# Patient Record
(demographics unavailable — no encounter records)

---
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
## 2024-12-02 NOTE — PHYSICAL EXAM
[EOMI] : grossly EOMI [NL] : moves all extremities x4, warm, well perfused x4 [Warm, Well Perfused x4] : warm, well perfused x4 [Capillary Refill <2s] : capillary refill < 2s [Warm] : warm [Clear] : clear [de-identified] : FROM intact at L wrist. NO focal tenderness reported to palpation.  [de-identified] : no edema noted

## 2024-12-02 NOTE — REVIEW OF SYSTEMS
[Myalgia] : no myalgia [Restriction of Motion] : no restriction of motion [Bone Deformity] : no bone deformity [Swelling of Joint] : no swelling of joint [Rash] : no rash [Easy Bruising] : no tendency for easy bruising [Bleeding Gums] : no bleeding gums [FreeTextEntry1] : Pt denies numness or tingling of L wrist, hand or fingers
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
## 2024-12-06 NOTE — PHYSICAL EXAM
[EOMI] : grossly EOMI [Erythematous Oropharynx] : erythematous oropharynx [NL] : normotonic [Warm] : warm [Clear] : clear [Conjuctival Injection] : no conjunctival injection [Eyelid Swelling] : no eyelid swelling [Allergic Shiners] : no allergic shiners [Enlarged Tonsils] : tonsils not enlarged [Vesicles] : no vesicles [Exudate] : no exudate [Ulcerative Lesions] : no ulcerative lesions [Palate petechiae] : palate without petechiae

## 2024-12-06 NOTE — HISTORY OF PRESENT ILLNESS
[de-identified] : Sore throat x 3 days [FreeTextEntry6] : Pt presented to [facility] for irritation and soreness of back of throat x 3 days, usually worse upon waking up and improved by eating and drinking throughout the day.

## 2024-12-06 NOTE — DISCUSSION/SUMMARY
[FreeTextEntry1] : sore throat, stuffy nose and cough - R/O strep pharyngitis. Rapid strep: negative. PCR pending. F/u 2-3 days for results - Postnasal drip vs viral URI vs sleeping in dry heat - Supportive care, antihistamines and avoiding airway irritant exposure recommended - Seek further care for any new or worsening s/sx. - Lozenges dispensed for immediate use - Pt verbalized understanding and endorsed plan - Safe return to class  Depression as screened on exam 3 days ago. Pt denies any current SI or plans. She plans on seeing SWer on next school day. She is also in touch with school support staff for any urgent needs.

## 2024-12-06 NOTE — REVIEW OF SYSTEMS
[Sore Throat] : sore throat [Fever] : no fever [Malaise] : no malaise [Headache] : no headache [Itchy Eyes] : no itchy eyes [Ear Pain] : no ear pain [Nasal Discharge] : no nasal discharge [Nasal Congestion] : no nasal congestion [Sinus Pressure] : no sinus pressure [Chest Pain] : no chest pain [Wheezing] : no wheezing [Cough] : no cough [Congestion] : no congestion [Shortness of Breath] : no shortness of breath [Appetite Changes] : no appetite changes [Vomiting] : no vomiting [Abdominal Pain] : no abdominal pain [Rash] : no rash

## 2024-12-09 NOTE — REVIEW OF SYSTEMS
[Fever] : fever [Headache] : no headache [Ear Pain] : no ear pain [Nasal Congestion] : nasal congestion [Sore Throat] : sore throat [Abdominal Pain] : no abdominal pain [Rash] : no rash

## 2024-12-09 NOTE — DISCUSSION/SUMMARY
[FreeTextEntry1] : Pt returned to [facility] to discuss strep PCR results. She is symptomatic and tested positive for strep pharyngitis group C/G Pharyngitis care and treatment reviewed with pt who verbalized understanding Called mom to discuss management, no answer. Unable to leave voice message- mailbox full Amoxicillin therapy started x 10 days. Meds appropriately labeled. Side effects reviewed Strep transmission discussed. Caution advised with use of toothbrush, utensils and drinks Reassess in 10 days Safe discharge to class

## 2024-12-09 NOTE — HISTORY OF PRESENT ILLNESS
[FreeTextEntry6] : Pt returned to [facility] for recent strep test results for pharyngitis. Positive for Strep C/G. She denies improvement in dysphagia and soreness of throat.

## 2024-12-09 NOTE — PHYSICAL EXAM
[Erythematous Oropharynx] : erythematous oropharynx [Enlarged Tonsils] : tonsils not enlarged [Exudate] : no exudate [Palate petechiae] : palate petechiae [NL] : regular rate and rhythm, normal S1, S2 audible, no murmurs [Warm] : warm [Clear] : clear

## 2025-01-10 NOTE — DISCUSSION/SUMMARY
[FreeTextEntry1] : Pt presented to [facility] for STI screening today.  Tests performed. Results pending LMP: 1/8/2025 LSA: Feb 2024 Pt reports use of male condoms with most sexual encounters to prevent pregnancy and STIs.   Birth control goal: Tianna states that she is currently not sexually active and does not plan on having sex in the immediate future. She is receptive to contraception education Detailed contraceptive counseling provided today. Pt verbalized understanding. No BCM started at this time.  Vaccine records reviewed yesterday. Pt is due for seasonal influenza and COVID vaccines. A vaccine consent form was provided yesterday. Pt brought back the form, but it's signed in the incorrect boxes.  I attempted to call mom while pt was present in office, for clarification. No answer. Vaccine consent form sent home with pt again.   F/U next week for results and vaccines

## 2025-01-10 NOTE — REVIEW OF SYSTEMS
[Negative] : Constitutional [Rash] : no rash [Dysuria] : no dysuria [Polyuria] : no polyuria [Hematuria] : no hematuria [Vaginal Dischage] : no vaginal discharge [Vaginal Itch] : no vaginal itch [Irregular Menstrual Cycle] : no irregular menstrual cycle [Vaginal Pain] : no vaginal pain

## 2025-01-11 NOTE — DISCUSSION/SUMMARY
[FreeTextEntry1] : Patient is a 15 yo F who presents to the [facility] for dysmenorrhea with an associated frontal headache that started yesterday.   Patient reports she has regular monthly cycles that last for 5 days Uses pads, states on her heaviest days she uses 2 pads. Flow is heavy to light, denies clots.  Denies back pain, n/v, diarrhea, lightheadedness or dizziness at this time. Has not taken any medication for symptoms today.   Last SA February 2024 with male, condoms sometimes. Patient reports she has not had STI testing before. Testing offered and accepted, declines completing today but will return tomorrow.   Plan: -Warm pack provided and Ibuprofen 400 mg given PO, tolerated -Educated on supportive care and for new or worsening symptoms to RTC -Appointment made for STI testing tomorrow, declined condoms today. Encouraged consistent condom use for STI/pregnancy prevention and she verbalized understanding.  -VIS/Consent for Influenza and Covid booster

## 2025-01-11 NOTE — PHYSICAL EXAM
[Tenderness] : no tenderness [Laceration] : no laceration [Ecchymosis] : no ecchymosis [Swelling] : no swelling [Traumatic] : atraumatic [Symmetric Chest Wall] : symmetric chest wall [Tenderness With Palpation of Chest Wall] : no tenderness with palpation of chest wall [NL] : regular rate and rhythm, normal S1, S2 audible, no murmurs [Soft] : soft [Distended] : nondistended [Normal Bowel Sounds] : normal bowel sounds [Hepatosplenomegaly] : no hepatosplenomegaly [Tenderness with Palpation] : tenderness with palpation [Splenomegaly] : no splenomegaly [Hepatomegaly] : no hepatomegaly [Mass] : no mass palpable [McBurney's point tenderness] : no McBurney's point tenderness [Rebound tenderness] : no rebound tenderness [Psoas Sign Positive] : psoas sign negative [Obturator Sign Positive] : obturator sign negative [FreeTextEntry9] : Mild bilateral lower TTP

## 2025-01-11 NOTE — REVIEW OF SYSTEMS
[Abdominal Pain] : abdominal pain [Negative] : Genitourinary [Headache] : headache [Eye Discharge] : no eye discharge [Eye Redness] : no eye redness [Itchy Eyes] : no itchy eyes [Changes in Vision] : no changes in vision [Ear Pain] : no ear pain [Nasal Discharge] : no nasal discharge [Nasal Congestion] : no nasal congestion [Snoring] : no snoring [Sinus Pressure] : no sinus pressure [Sore Throat] : no sore throat [Appetite Changes] : no appetite changes [PO Intolerance] : PO tolerance [Vomiting] : no vomiting [Diarrhea] : no diarrhea [Constipation] : no constipation [Gaseous] : not gaseous

## 2025-01-11 NOTE — HISTORY OF PRESENT ILLNESS
[FreeTextEntry6] : Patient is a 15 yo F who presents to the [facility] for dysmenorrhea with an associated frontal headache that started yesterday.    Patient reports she has regular monthly cycles that last for 5 days Uses pads, states on her heaviest days she uses 2 pads. Flow is heavy to light, denies clots.  Denies back pain, n/v, diarrhea, lightheadedness or dizziness at this time. Has not taken any medication for symptoms today.   [place] 13 yo  Last SA February 2024 with male  1 Lifetime male partner Condoms sometimes Denies /GI symptoms

## 2025-01-11 NOTE — HISTORY OF PRESENT ILLNESS
[FreeTextEntry6] : Patient is a 15 yo F who presents to the [facility] for dysmenorrhea with an associated frontal headache that started yesterday.    Patient reports she has regular monthly cycles that last for 5 days Uses pads, states on her heaviest days she uses 2 pads. Flow is heavy to light, denies clots.  Denies back pain, n/v, diarrhea, lightheadedness or dizziness at this time. Has not taken any medication for symptoms today.   [place] 15 yo  Last SA February 2024 with male  1 Lifetime male partner Condoms sometimes Denies /GI symptoms

## 2025-01-11 NOTE — DISCUSSION/SUMMARY
[FreeTextEntry1] : Patient is a 15 yo F who presents to the [facility] for dysmenorrhea with an associated frontal headache that started yesterday.   Patient reports she has regular monthly cycles that last for 5 days Uses pads, states on her heaviest days she uses 2 pads. Flow is heavy to light, denies clots.  Denies back pain, n/v, diarrhea, lightheadedness or dizziness at this time. Has not taken any medication for symptoms today.   Last SA February 2024 with male, condoms sometimes. Patient reports she has not had STI testing before. Testing offered and accepted, declines completing today but will return tomorrow.   Plan: -Warm pack provided and Ibuprofen 400 mg given PO, tolerated -Educated on supportive care and for new or worsening symptoms to RTC -Appointment made for STI testing tomorrow, declined condoms today. Encouraged consistent condom use for STI/pregnancy prevention and she verbalized understanding.  -VIS/Consent for Influenza and Covid booster

## 2025-01-14 NOTE — HISTORY OF PRESENT ILLNESS
[Influenza] : Influenza [COVID-19] : COVID-19 [FreeTextEntry1] : Pt returned to [facility] for recent STI results and vaccine follow up. She is currently due for seasonal influenza, and COVID-19 vaccines. She reports feeling well today. She denies any significant reactions to vaccines administered in the past.

## 2025-01-14 NOTE — DISCUSSION/SUMMARY
[FreeTextEntry1] : Recent STI results reviewed: GC, CT, HIV and syphilis are all negative.  Safe sex practices advised  Vaccine consent received for influenza and COVID vaccines There are no known contraindications to vaccines today Both vaccines administered. Post vaccine care reviewed Pt observed for immediate side effects after vaccines. She remains well Safe discharge to class  F/u 2 days for Hep A and HPV vaccines

## 2025-01-16 NOTE — DISCUSSION/SUMMARY
[FreeTextEntry1] : Pt returned to [facility] for vaccine updates today. She is due for HPV and Hep A. Parent consent obtained previously Both vaccines administered Post vaccine care reviewed Safe discharge to class F/U November for Men ACW[facility] booster

## 2025-01-16 NOTE — HISTORY OF PRESENT ILLNESS
[Hepatitis A] : Hepatitis A [HPV] : HPV [FreeTextEntry1] : Pt returned to [facility] for Hep A and HPV vaccines today. She denies any new concerns or significant side effects to previously administered vaccines

## 2025-02-24 NOTE — HISTORY OF PRESENT ILLNESS
[FreeTextEntry6] : Chest pain while running x 2 hour ago, improved with resting and drinking water. Pt states that this is a common occurrence for 1 year, and she would like a "check up"

## 2025-02-24 NOTE — DISCUSSION/SUMMARY
[FreeTextEntry1] : Chest pain with exertion x 1 year Normal physical exam and vitals reviewed. 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 She is currently NOT CLEARED to play sports. This was communicated with [facility] , Mr. Rai, in writing.  Cardiologist consult required. Referral provided.  Outreach attempted to mom. Mom did not answer. Voicemail left. Stepdad contacted via phone. Chest pain and appropriate assessment discussed. Dad endorsed plan. F/U after cardiologist consult.

## 2025-02-24 NOTE — PHYSICAL EXAM
[NL] : no acute distress, alert [Symmetric Chest Wall] : symmetric chest wall [Clear to Auscultation Bilaterally] : clear to auscultation bilaterally [Regular Rate and Rhythm] : regular rate and rhythm [Normal S1, S2 audible] : normal S1, S2 audible

## 2025-02-24 NOTE — REVIEW OF SYSTEMS
[Chest Pain] : chest pain [Cough] : cough [Shortness of Breath] : shortness of breath [Appetite Changes] : appetite changes [Intolerance to Exercise] : intolerance to exercise [PO Intolerance] : PO tolerance [Vomiting] : no vomiting [Diarrhea] : no diarrhea [Constipation] : no constipation [Gaseous] : not gaseous [Abdominal Pain] : no abdominal pain [Weakness] : no weakness [Rash] : no rash

## 2025-02-25 NOTE — DISCUSSION/SUMMARY
[FreeTextEntry1] : Tianna presented to [facility] for chest pain present intermittently for 24 hours. Normal physical exam and vitals reviewed. Reassurance provided.  Differential discussed. Indigestion vs Costochondritis likely Ibuprofen and antacid dispensed for immediate use. Pt tolerated Management discussed with brenda via phone.  Cardiologist referral was provided yesterday. Brenad states that he plans on scheduling an appt soon. Pt advised to avoid all vigorous physical activity at this time, including track (communicated with school yesterday). PSAL clearance pending cardiologist clearance Brenda was asked to follow up with PCP, Dr. Gary Lujan as well. He endorsed plan Advised to seek urgent medical care for any new or worsening s/sx. Pt verbalized understanding Safe discharge to class

## 2025-02-25 NOTE — REVIEW OF SYSTEMS
[Chest Pain] : chest pain [Negative] : Constitutional [Fever] : no fever [Chills] : no chills [Diaphoresis] : not diaphoretic [Intolerance to Exercise] : tolerance to exercise [Tachypnea] : not tachypneic [Wheezing] : no wheezing [Cough] : no cough [Shortness of Breath] : no shortness of breath [Appetite Changes] : no appetite changes [PO Intolerance] : PO tolerance [Vomiting] : no vomiting [Diarrhea] : no diarrhea [Constipation] : no constipation [Abdominal Pain] : no abdominal pain [Weakness] : no weakness [Lightheadness] : no lightheadness [Dizziness] : no dizziness [Restriction of Motion] : no restriction of motion [Rash] : no rash

## 2025-02-25 NOTE — PHYSICAL EXAM
[EOMI] : grossly EOMI [Supple] : supple [Symmetric Chest Wall] : symmetric chest wall [NL] : regular rate and rhythm, normal S1, S2 audible, no murmurs [Normotonic] : normotonic [Warm] : warm [Clear] : clear [Allergic Shiners] : no allergic shiners [Nasal Flaring] : no nasal flaring [Inflamed Nasal Mucosa] : no nasal mucosa inflammation [Erythematous Oropharynx] : nonerythematous oropharynx [Vesicles] : no vesicles [Exudate] : no exudate [Tenderness With Palpation of Chest Wall] : no tenderness with palpation of chest wall [Capillary Refill <2s] : capillary refill < 2s [de-identified] : No chest pain reported with movement of upper body [de-identified] : skin turgor: normal

## 2025-02-25 NOTE — HISTORY OF PRESENT ILLNESS
[de-identified] : intermittent chest pain ongoing since yesterday [FreeTextEntry6] : 15 y/o with no significant PMH presented to [facility] with central chest pain present for 24 hours. Pt states that this is an ongoing concern x 1 year. Pain started with exertion yesterday, improved and has been present intermittently, also while at rest. Pain described as 8/10, 'sticking' and non-radiating. She denies SOB, cough, wheezing, dizziness, lightheadedness, cyanosis or palpitations. Pt denies recent weightlifting or exercises involving chest wall Pt also denies indigestion, heart burn, or appetite changes

## 2025-02-26 NOTE — PHYSICAL EXAM
[Regular Rate and Rhythm] : regular rate and rhythm [Normal S1, S2 audible] : normal S1, S2 audible [Symmetric Chest Wall] : symmetric chest wall [NL] : moves all extremities x4, warm, well perfused x4 [Tenderness With Palpation of Chest Wall] : no tenderness with palpation of chest wall [Murmur] : no murmur [Tachycardia] : no tachycardia [FreeTextEntry7] : RR 15

## 2025-02-26 NOTE — DISCUSSION/SUMMARY
[FreeTextEntry1] : Patient is a 15 yo F who reports chest pain that started within the past hour and a half while she was walking to class.   Patient reports the pain is non radiating and stabbing in nature.  She states sitting makes the pain better, movement makes the pain worse She reports she is able to walk up and down stairs without becoming out of breath.   Denies trauma, n/v, URI symptoms, fatigue, fever, shortness of breath, fever, rash, joint pain or swelling.  Denies radiating arm, jaw, back pain or lifting weights or heavy objects.   Patient did not eat today because she was in rush to come to school. Drank water throughout the day, tolerated.  Last ate valdes chicken, fried shrimp, and white rice for dinner yesterday, tolerated.  Of note, on 2/24/25 patient was referred to Cardiology as she had reported she has had chest pain for over a year and her sports clearance was revoked on pending cardiology clearance. Per chart review, patient has been seen at the [facility] on 2/24/25 and 2/25/25 for chest pain.   In addition, patient was treated for Strep Throat December 2024 with Amoxicillin for 10 days and completed medication as ordered.   Physical exam WNL, vitals stable.  Plan:  -Outreach completed to patients mother Ms. Lopez at [phone] on visit. Mother states they have made a cardiology appt on 4/17/25 for further evaluation. Educated patient and mother on completing an EKG today for further evaluation, mother and patient agree. [facility] made aware and patient will be picked up to go to . Educated patient and mother for n/v, lightheadedness, dizziness, radiating pain, shortness of breath or new, persistent, or worsening symptoms to seek emergency care and they verbalized understanding.  -Encouraged to verbalize any questions or concerns, all questions answered at this time. -By the end of visit patient reports that the chest pain has self-resolved, no interventions at this time -Educational handout provided on Chest Pain from [email or web address] -Patient sent to the Main Office for  -Encouraged mother and patient to verbalize any questions or concerns, all questions answered at this time.

## 2025-02-26 NOTE — HISTORY OF PRESENT ILLNESS
[FreeTextEntry6] : Patient is a 15 yo F who reports chest pain that started within the past hour and a half while she was walking to class.   Patient reports the pain is non radiating and stabbing in nature.  She states sitting makes the pain better, movement makes the pain worse She reports she is able to walk up and down stairs without becoming out of breath.   Denies trauma, n/v, URI symptoms, fatigue, fever, shortness of breath, fever, rash, joint pain or swelling.  Denies radiating arm, jaw, back pain or lifting weights or heavy objects.   Patient did not eat today because she was in rush to come to school. Drank water throughout the day, tolerated.  Last ate valdes chicken, fried shrimp, and white rice for dinner yesterday, tolerated.  Of note, on 2/24/25 patient was referred to Cardiology as she had reported she has had chest pain for over a year and her sports clearance was revoked on pending cardiology clearance. Per chart review, patient has been seen at the [facility] on 2/24/25 and 2/25/25 for chest pain.   In addition, patient was treated for Strep Throat December 2024 with Amoxicillin for 10 days and completed medication as ordered.

## 2025-02-26 NOTE — DISCUSSION/SUMMARY
[FreeTextEntry1] : Patient is a 15 yo F who reports chest pain that started within the past hour and a half while she was walking to class.   Patient reports the pain is non radiating and stabbing in nature.  She states sitting makes the pain better, movement makes the pain worse She reports she is able to walk up and down stairs without becoming out of breath.   Denies trauma, n/v, URI symptoms, fatigue, fever, shortness of breath, fever, rash, joint pain or swelling.  Denies radiating arm, jaw, back pain or lifting weights or heavy objects.   Patient did not eat today because she was in rush to come to school. Drank water throughout the day, tolerated.  Last ate valdes chicken, fried shrimp, and white rice for dinner yesterday, tolerated.  Of note, on 2/24/25 patient was referred to Cardiology as she had reported she has had chest pain for over a year and her sports clearance was revoked on pending cardiology clearance. Per chart review, patient has been seen at the [facility] on 2/24/25 and 2/25/25 for chest pain.   In addition, patient was treated for Strep Throat December 2024 with Amoxicillin for 10 days and completed medication as ordered.   Physical exam WNL, vitals stable.  Plan:  -Outreach completed to patients mother Ms. Lopez at [phone] on visit. Mother states they have made a cardiology appt on 4/17/25 for further evaluation. Educated patient and mother on completing an EKG today for further evaluation, mother and patient agree. [facility] made aware and patient will be picked up to go to . Educated patient and mother for n/v, lightheadedness, dizziness, radiating pain, shortness of breath or new, persistent, or worsening symptoms to seek emergency care and they verbalized understanding.  -Encouraged to verbalize any questions or concerns, all questions answered at this time. -By the end of visit patient reports that the chest pain has self-resolved, no interventions at this time -Educational handout provided on Chest Pain from [employer] -Patient sent to the Main Office for  -Encouraged mother and patient to verbalize any questions or concerns, all questions answered at this time.

## 2025-02-26 NOTE — HISTORY OF PRESENT ILLNESS
[FreeTextEntry6] : Patient is a 15 yo F who reports chest pain that started within the past hour and a half while she was walking to class.   Patient reports the pain is non radiating and stabbing in nature.  She states sitting makes the pain better, movement makes the pain worse She reports she is able to walk up and down stairs without becoming out of breath.   Denies trauma, n/v, URI symptoms, fatigue, fever, shortness of breath, fever, rash, joint pain or swelling.  Denies radiating arm, jaw, back pain or lifting weights or heavy objects.   Patient did not eat today because she was in rush to come to school. Drank water throughout the day, tolerated.  Last ate valdes chicken, fried shrimp, and white rice for dinner yesterday, tolerated.  Of note, on 2/24/25 patient was referred to Cardiology as she had reported she has had chest pain for over a year and her sports clearance was revoked on pending cardiology clearance. Per chart review, patient has been seen at the [facility] on 2/24/25 and 2/25/25 for chest pain.   In addition, patient was treated for Strep Throat December 2024 with Amoxicillin for 10 days and completed medication as ordered.

## 2025-04-07 NOTE — REVIEW OF SYSTEMS
[Malaise] : malaise [Nasal Congestion] : nasal congestion [Appetite Changes] : appetite changes [Fever] : no fever [Chills] : no chills [Difficulty with Sleep] : no difficulty with sleep [Headache] : no headache [Ear Pain] : no ear pain [Sinus Pressure] : no sinus pressure [Sore Throat] : no sore throat [Abdominal Pain] : no abdominal pain [Weakness] : no weakness [Lightheadness] : no lightheadness [Myalgia] : no myalgia

## 2025-04-07 NOTE — PHYSICAL EXAM
[Alert] : alert [EOMI] : grossly EOMI [Pink Nasal Mucosa] : pink nasal mucosa [Clear Rhinorrhea] : clear rhinorrhea [Symmetric Chest Wall] : symmetric chest wall [NL] : regular rate and rhythm, normal S1, S2 audible, no murmurs [Normotonic] : normotonic [Warm] : warm [Clear] : clear [Acute Distress] : no acute distress [Tired appearing] : not tired appearing [Conjuctival Injection] : no conjunctival injection [Increased Tearing] : no increased tearing [Discharge] : no discharge [Eyelid Swelling] : no eyelid swelling [Allergic Shiners] : no allergic shiners [Hypertrophied Nasal Mucosa] : no nasal mucosa hypertrophy [Erythematous Oropharynx] : nonerythematous oropharynx [Cobblestoning] : no cobblestoning of posterior pharynx [Enlarged Tonsils] : tonsils not enlarged [Exudate] : no exudate

## 2025-04-07 NOTE — DISCUSSION/SUMMARY
[FreeTextEntry1] : Pt presented to [facility] with nasal congestion and cough x 4 days No viral tests performed considering illness x 4 days and improving. Lozenges dispensed for symptomatic relief.  Seasonal allergies reviewed with pt. Advised to seek further medical care for any new or worsening s/sx Supportive care advised. Pt verbalized understanding Safe discharge to class with face mask on

## 2025-04-07 NOTE — HISTORY OF PRESENT ILLNESS
[FreeTextEntry6] : Pt presented to [facility] for runny nose and cough x 4 days- now improving She denies any known sick contact

## 2025-04-11 NOTE — REVIEW OF SYSTEMS
[Dysuria] : dysuria [Hematuria] : hematuria [Fever] : no fever [Chills] : no chills [Malaise] : no malaise [Headache] : no headache [Appetite Changes] : no appetite changes [PO Intolerance] : PO tolerance [Vomiting] : no vomiting [Diarrhea] : no diarrhea [Constipation] : no constipation [Abdominal Pain] : no abdominal pain [Weakness] : no weakness [Myalgia] : no myalgia [Rash] : no rash [Polyuria] : no polyuria [Irregular Vaginal Bleeding] : no irregular vaginal bleeding [Vaginal Dischage] : no vaginal discharge [Vaginal Itch] : no vaginal itch [Vaginal Pain] : no vaginal pain

## 2025-04-11 NOTE — DISCUSSION/SUMMARY
[FreeTextEntry1] : Pt presented to [facility] for  Dysuria: Differential discussed  R/O UTI. POCT UA: within normal limits except for proteinuria, trace blood and leuk esterase. UTI unlikely. Culture pending. Proteinuria: will repeat next visit. No previous comparativ analysis present. No known concern of renal impairment at this time Empiric nitrofurantoin therapy reviewed and offered to pt. Pt verbalized understanding and endorsed plan   R/O Vaginitis: Trichomonas NAAT, vaginitis panel, GC and CT urine sent out. Results pending Probiotics use promoted. Avoid bubble baths and use of scented skin care products in and around vagina. Safe sex practices advised  Pregnancy goal discussed: Pt does not wish to conceive for a few years. Contraception counseling provided in detail. Pt decided to start transdermal patches for birth control Proper use, and side effects reviewed. No current contraindications to use. Pt counseled on abstaining or strictly using condoms for 7 days after initiation of 'patch' method of birth control.   "The Patch" fact sheet provided to pt in office.  Informed, signed consent form obtained. Urine pregnancy test: negative. 1 month supply of patches, provided and appropriately labeled. F/u 3 weeks for repeat pregnancy test and refill Condoms dispensed. Pt accepted Safe discharge to class.  STI tests offered. Pt accepted. Results pending  Phone follow up next week ([facility]/school is closed for spring recess) Safe discharge to class

## 2025-04-11 NOTE — PHYSICAL EXAM
[NL] : regular rate and rhythm, normal S1, S2 audible, no murmurs [Soft] : soft [Juan Ramon: ____] : Juan Ramon [unfilled] [Normal external genitalia] : normal external genitalia [No Abnormal Lymph Nodes Palpated] : no abnormal lymph nodes palpated [Inguinal] : inguinal [Moves All Extremities x 4] : moves all extremities x4 [Warm] : warm [Clear] : clear [Tender] : nontender [Distended] : nondistended [Normal Bowel Sounds] : abnormal bowel sounds [McBurney's point tenderness] : no McBurney's point tenderness [Labial adhesions] : no labial adhesions [Erythematous labia minora] : nonerythematous labia minora [Erythematous labia majora] : nonerythematous labia majora [Excoriations in perineum] : no excoriations in perineum [Vaginal discharge] : no vaginal discharge [FreeTextEntry9] : Denies CVA tenderness [FreeTextEntry6] : Chaperone offered. Pt declined.

## 2025-04-11 NOTE — HISTORY OF PRESENT ILLNESS
[de-identified] : Vaginal/urinary burning x 5 days- getting worse [FreeTextEntry6] : Pt presented to [facility] for vaginal/urinary burning x 5 days  LMP: 3/24/25 LSA: 4/5/2025, male partner, denies use of condoms or other contraception.

## 2025-04-22 NOTE — PHYSICAL EXAM
[Acute Distress] : acute distress [Alert] : alert [EOMI] : grossly EOMI [Conjuctival Injection] : no conjunctival injection [Increased Tearing] : no increased tearing [Eyelid Swelling] : no eyelid swelling [Allergic Shiners] : no allergic shiners [Pink Nasal Mucosa] : pink nasal mucosa [Hypertrophied Nasal Mucosa] : no nasal mucosa hypertrophy [Bleeding] : no bleeding [Erythematous Oropharynx] : nonerythematous oropharynx [Enlarged Tonsils] : tonsils not enlarged [NL] : moves all extremities x4, warm, well perfused x4 [Normotonic] : normotonic [Warm] : warm [Clear] : clear [FreeTextEntry5] : No hyphemia. ecchymosis under R infraorbital margin measuring approximately 2 cm x 2 cm x 1 cm. No palpable orbital fracture [FreeTextEntry3] : No otorrhea in ear canals [FreeTextEntry4] : No crepitation of nasal bones  [de-identified] : ecchymosis under R eye. No other skin discoloration or signs of trauma or infection present on skin.

## 2025-04-22 NOTE — END OF VISIT
Patient Quality Outreach    Patient is due for the following:   Physical Annual Wellness Visit    Next Steps:   Schedule a Annual Wellness Visit    Type of outreach:    Sent [employer] message.      Questions for provider review:    None           Jyoti Lundberg MA        [Time Spent: ___ minutes] : I have spent [unfilled] minutes of time on the encounter which excludes teaching and separately reported services.
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
## 2025-04-22 NOTE — HISTORY OF PRESENT ILLNESS
[FreeTextEntry6] : Pt presented to [facility] for R eye injury on 4/18/25  F/U bacterial vaginosis as confirmed by bacterial vaginosis panel on 4/11/2025. Pt states that she started the Metronidazole PO therapy late last week and is not done with the full course yet.  She states that she is no longer experiencing dysuria or abnormal vaginal discharge.

## 2025-04-22 NOTE — REVIEW OF SYSTEMS
[Headache] : no headache [Eye Discharge] : no eye discharge [Eye Redness] : no eye redness [Changes in Vision] : no changes in vision [Nasal Discharge] : no nasal discharge [Nasal Congestion] : no nasal congestion [Sinus Pressure] : no sinus pressure

## 2025-05-09 NOTE — PHYSICAL EXAM
[Soft] : soft [Tender] : nontender [Distended] : nondistended [Normal Bowel Sounds] : normal bowel sounds [Tenderness with Palpation] : no tenderness with palpation [NL] : moves all extremities x4, warm, well perfused x4 [Normotonic] : normotonic [Warm] : warm [Clear] : clear [FreeTextEntry9] : No CVA tenderness reported on palpation [de-identified] : FROM intact at lower back [de-identified] : No discoloration or skin changes notable on lower back
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
## 2025-05-09 NOTE — DISCUSSION/SUMMARY
[FreeTextEntry1] : Pt presented to [facility] for lower back pain x 1 day- likely related to poor posture while sleeping rest, NSAIDs and Epsom salt soaks advised. Ibuprofen offered. Pt declined Warm compresses provided in office. Pt applied and returned to class (dismissal time from school) F/U for any new or worsening signs or symptoms. Pt agrees with management

## 2025-05-09 NOTE — REVIEW OF SYSTEMS
[Dysuria] : no dysuria [Polyuria] : no polyuria [Hematuria] : no hematuria [Irregular Vaginal Bleeding] : no irregular vaginal bleeding [Vaginal Dischage] : no vaginal discharge [Vaginal Itch] : no vaginal itch [Irregular Menstrual Cycle] : no irregular menstrual cycle [Vaginal Pain] : no vaginal pain

## 2025-05-15 NOTE — CONSULT LETTER
[FreeTextEntry4] : no PCP [de-identified] : Donn Cash MD, FAAP  and Systems Chief, Pediatric Cardiology [facility] [place] [address], [address] [place] [address] Office: [phone] Fax: [phone]

## 2025-05-15 NOTE — REASON FOR VISIT
[Initial Consultation] : an initial consultation for [Mother] : mother [Chest Pain] : chest pain [FreeTextEntry3] : post spicy food

## 2025-05-15 NOTE — PHYSICAL EXAM
[General Appearance - Alert] : alert [General Appearance - In No Acute Distress] : in no acute distress [General Appearance - Well Nourished] : well nourished [General Appearance - Well Developed] : well developed [General Appearance - Well-Appearing] : well appearing [Appearance Of Head] : the head was normocephalic [Facies] : there were no dysmorphic facial features [Sclera] : the conjunctiva were normal [Outer Ear] : the ears and nose were normal in appearance [Examination Of The Oral Cavity] : mucous membranes were moist and pink [Auscultation Breath Sounds / Voice Sounds] : breath sounds clear to auscultation bilaterally [Normal Chest Appearance] : the chest was normal in appearance [Apical Impulse] : quiet precordium with normal apical impulse [Heart Rate And Rhythm] : normal heart rate and rhythm [Heart Sounds] : normal S1 and S2 [No Murmur] : no murmurs  [Heart Sounds Gallop] : no gallops [Heart Sounds Pericardial Friction Rub] : no pericardial rub [Edema] : no edema [Arterial Pulses] : normal upper and lower extremity pulses with no pulse delay [Heart Sounds Click] : no clicks [Capillary Refill Test] : normal capillary refill [Bowel Sounds] : normal bowel sounds [Abdomen Soft] : soft [Nondistended] : nondistended [Abdomen Tenderness] : non-tender [Nail Clubbing] : no clubbing  or cyanosis of the fingers [Motor Tone] : normal muscle strength and tone [Cervical Lymph Nodes Enlarged Anterior] : The anterior cervical nodes were normal [Cervical Lymph Nodes Enlarged Posterior] : The posterior cervical nodes were normal [] : no rash [Skin Lesions] : no lesions [Skin Turgor] : normal turgor [Demonstrated Behavior - Infant Nonreactive To Parents] : interactive [Mood] : mood and affect were appropriate for age [Demonstrated Behavior] : normal behavior [FreeTextEntry7] : no murmur with stand from squat

## 2025-05-15 NOTE — HISTORY OF PRESENT ILLNESS
[FreeTextEntry1] : I had the pleasure of seeing MACKENZIE VASQUEZ in [facility] on 05/15/2025 for an initial consultation. As you are aware, MACKENZIE is a 15 year old girl who was referred for cardiac evaluation in the setting of chest pain. She states that this occurs exclusively after she eats spicy food. She feels a burning behind her sternum. It gets better with drinking liquids and rest. She does state that if she has had spicy food in the morning she will feel similar symptoms later in the day when she runs track. She does not have to stop her activity and she says that she never has any chest pain on days that she does not eat spicy food with, or without, activity.   Overall She has been thriving at home, has been eating without difficulty, and has been gaining weight and developing appropriately. Her activity level is excellent. \  There has been no, tachypnea, increased work of breathing, cyanosis, excessive diaphoresis, unexplained irritability, or syncope.  There is no history of sudden early death, syncope, pacemakers cardiomyopathy or heart transplant or other early onset CV issues in the family.

## 2025-05-15 NOTE — DISCUSSION/SUMMARY
[FreeTextEntry1] : In summary, MACKENZIE VASQUEZ is a 15 year old female with chest pain that occurs consistently, and exclusively on days when she eats spicy food.. Today the cardiac physical exam and EKG, are normal and reassuring. I discussed at length with the family that these symptoms are not related to cardiac pathology.  We discussed the more common causes of chest pain in children, including musculoskeletal pain, pulmonary conditions such as asthma, and gastrointestinal conditions such as reflux. given the strong and consistent relationship to spicy food this appears to be the most likely etiology. I advised that they discuss with their primary care team options for SAL.  The family verbalized understanding, and all questions were answered.   From a cardiac standpoint there are no physical limitations, no contraindications to any medications she should require, no cardiac contraindications to anesthesia or surgical procedures, and no requirement for SBE prophylaxis prior to procedures.   From a CV perspective all routine vaccinations including flu vaccination are recommended  No further cardiology follow-up is required, although we would be happy to see MACKENZIE back at any time should questions or concerns arise.  [Needs SBE Prophylaxis] : [unfilled] does not need bacterial endocarditis prophylaxis [PE + No Restrictions] : [unfilled] may participate in the entire physical education program without restriction, including all varsity competitive sports. [Influenza vaccine is recommended] : Influenza vaccine is recommended

## 2025-07-24 NOTE — DISCUSSION/SUMMARY
[FreeTextEntry1] : Patient is a 15 yo F who presents to the [facility] for follow up labs.   On 7/22/24 patient reported that she had vaginal itching and a thick white discharge that started the day after having sex without condom on 7/18/25 with male.  She reports since being seen the itching and discharge have improved.  Currently she denies abdominal pain, dysuria, back low, n/v or other /GI symptoms. She also denies having multiple sex partners.   Coitarche 15 yo 3 Lifetime partners, current partner is a 17 yo M for 1 month Denies any anal SA ever. Condoms never, states it is a shared mutual decision. Denies trading sex for money, food, or shelter Denies human trafficking.  BV panel shows positive for yeast and gardnerella.  HIV and GC/CT (oral) negative. Pending GC/CT (urine), Trichomonas, and Syphilis.    Plan:  -BD Affirm positive for Gardnerella vaginalis.  -Dispensed (1) Metronidazole 0.75% vaginal gel with 5 applicators for use for 5 nights. Medication information provided from [facility]. -Counseled on abstinence from alcohol during course of treatment and for 24 hours after completion of treatment. Counseled regarding possible side effects of antibiotic. -Counseled regarding preventative measures - encouraged consistent condom use, abstaining from use of feminine hygiene products, scented sanitary products, detergents, and soaps, wearing cotton-lined underwear, wiping from front to back. Educational handout provided from [facility]. -Abstain from sex until symptoms resolve.  -RTC PRN persistent or worsening symptoms and she verbalized understanding  -Educated patient that if she has recurrent BV (greater than or equal to 3 occurrences within 12 months) she will have to be on an extended treatment for 4 to 6 months.   Vaginal Yeast Infection -BD Affirm positive for candida.  -Diflucan 150 mg po x 1 dispensed.  Medication information provided from [facility]. -Abstain from sex until symptoms resolve.  -RTC PRN persistent or worsening symptoms.  Sexually active -Patient reports no SA since last being seen at the [facility]. She has not started the patches as a BCM, reports she will start today.  -GC/CT (urine), Trichomonas, and Syphilis are still pending, will reach out to patient with any abnormal results.   Encouraged patient to verbalize any questions or concerns, all questions answered at this time.

## 2025-07-24 NOTE — HISTORY OF PRESENT ILLNESS
[FreeTextEntry6] : Patient is a 15 yo F who presents to the [facility] for follow up labs.   On 7/22/24 patient reported that she had vaginal itching and a thick white discharge that started the day after having sex without condom on 7/18/25 with male.  She reports since being seen the itching and discharge have improved.  Currently she denies abdominal pain, dysuria, back low, n/v or other /GI symptoms. She also denies having multiple sex partners.   Coitarche 15 yo 3 Lifetime partners, current partner is a 15 yo M for 1 month Denies any anal SA ever. Condoms never, states it is a shared mutual decision. Denies trading sex for money, food, or shelter Denies human trafficking.  BV panel shows positive for yeast and gardnerella.  HIV and GC/CT (oral) negative. Pending GC/CT (urine), Trichomonas, and Syphilis.

## 2025-07-24 NOTE — DISCUSSION/SUMMARY
[FreeTextEntry1] : Patient is a 15 yo F who presents to the [facility] for patch refill, last dispensed on 4/11/25.  Last vaginal and oral (gave) SA without condom was on 7/18/25 with male.   Coitarche 15 yo  3 Lifetime partners, current partner is a 15 yo M for 1 month Denies any anal SA ever.  Condoms never, states it is a shared mutual decision.  Denies trading sex for money, food, or shelter Denies human trafficking.  Patient reports that she has vaginal itching and a white thick discharge that started the day after having sex without condom. Denies abdominal pain, dysuria, back low, n/v or other /GI symptoms.   Patient is interested in continuing the transdermal patch.  She has no contraindications to estrogen as reviewed by the Medical Eligibility Criteria for Contraceptive Use as reviewed with patient today.     Urine HCG negative today.   Plan:  Emergency Contraception (EC) -Urine HCG negative today -Educated patient on EC and she verbalized understanding and accepted. -Reviewed consent, signed and scanned into chart. EC taken under direct observation, tolerated.  -Patient to RTC on 8/12/25 at 10:25am for a repeat pregnancy test.   Patch Refill -Reviewed signed consent present in chart. -Dispensed a 3-month supply of Zafemy patches.  -Counseled regarding rare risk of VTE, ACHES symptoms reviewed.  Counseled regarding common potential side effects and reviewed protocol for patches applied late and for detached patches.  -Encouraged consistent condom use for STI prevention, condoms offered and accepted. -Discussed emergency contraception and indications for use  -Follow up in 3-4 weeks for contraceptive surveillance and repeat pregnancy test, appointment scheduled for 8/12/25 at 10:25am or RTC sooner as needed.  -Patch refill appt scheduled for 10/6/25 at 9am  STI testing/Vaginal Discharge/Itching -BV panel completed -GC/CT (oral/urine), HIV, Syphilis testing completed -RTC on 7/24/25 at 11:05am for results and follow up -Educated patient on how HIV is transmitted as well as other STIs, educational handouts provided from [employer].  Behavioral health history reviewed and anticipatory guidance provided Patient agrees to re engaged in MH services with the [facility], appointment made with [facility] CHRISTOS Leger on 7/25/25 at 12:00pm.   Encouraged to verbalize any questions or concerns, all questions answered at this time.

## 2025-07-24 NOTE — DISCUSSION/SUMMARY
[FreeTextEntry1] : Patient is a 15 yo F who presents to the [facility] for patch refill, last dispensed on 4/11/25.  Last vaginal and oral (gave) SA without condom was on 7/18/25 with male.   Coitarche 15 yo  3 Lifetime partners, current partner is a 15 yo M for 1 month Denies any anal SA ever.  Condoms never, states it is a shared mutual decision.  Denies trading sex for money, food, or shelter Denies human trafficking.  Patient reports that she has vaginal itching and a white thick discharge that started the day after having sex without condom. Denies abdominal pain, dysuria, back low, n/v or other /GI symptoms.   Patient is interested in continuing the transdermal patch.  She has no contraindications to estrogen as reviewed by the Medical Eligibility Criteria for Contraceptive Use as reviewed with patient today.     Urine HCG negative today.   Plan:  Emergency Contraception (EC) -Urine HCG negative today -Educated patient on EC and she verbalized understanding and accepted. -Reviewed consent, signed and scanned into chart. EC taken under direct observation, tolerated.  -Patient to RTC on 8/12/25 at 10:25am for a repeat pregnancy test.   Patch Refill -Reviewed signed consent present in chart. -Dispensed a 3-month supply of Zafemy patches.  -Counseled regarding rare risk of VTE, ACHES symptoms reviewed.  Counseled regarding common potential side effects and reviewed protocol for patches applied late and for detached patches.  -Encouraged consistent condom use for STI prevention, condoms offered and accepted. -Discussed emergency contraception and indications for use  -Follow up in 3-4 weeks for contraceptive surveillance and repeat pregnancy test, appointment scheduled for 8/12/25 at 10:25am or RTC sooner as needed.  -Patch refill appt scheduled for 10/6/25 at 9am  STI testing/Vaginal Discharge/Itching -BV panel completed -GC/CT (oral/urine), HIV, Syphilis testing completed -RTC on 7/24/25 at 11:05am for results and follow up -Educated patient on how HIV is transmitted as well as other STIs, educational handouts provided from [employer].  Behavioral health history reviewed and anticipatory guidance provided Patient agrees to re engaged in MH services with the [facility], appointment made with [facility] CHRIST[facility] Leger on 7/25/25 at 12:00pm.   Encouraged to verbalize any questions or concerns, all questions answered at this time.

## 2025-07-24 NOTE — RISK ASSESSMENT
[Eats meals with family] : eats meals with family [Has family members/adults to turn to for help] : has family members/adults to turn to for help [Is permitted and is able to make independent decisions] : Is permitted and is able to make independent decisions [Grade: ____] : Grade: [unfilled] [Eats regular meals including adequate fruits and vegetables] : eats regular meals including adequate fruits and vegetables [Drinks non-sweetened liquids] : drinks non-sweetened liquids  [Calcium source] : calcium source [Home is free of violence] : home is free of violence [Has friends] : has friends [At least 1 hour of physical activity a day] : at least 1 hour of physical activity a day [Uses safety belts/safety equipment] : uses safety belts/safety equipment  [Has peer relationships free of violence] : has peer relationships free of violence [Has/had oral sex] : has/had oral sex [Has had sexual intercourse] : has had sexual intercourse [Vaginal] : vaginal [Has ways to cope with stress] : has ways to cope with stress [Displays self-confidence] : displays self-confidence [Has problems with sleep] : has problems with sleep [Gets depressed, anxious, or irritable/has mood swings] : gets depressed, anxious, or irritable/has mood swings [With Teen] : teen [Has concerns about body or appearance] : does not have concerns about body or appearance [Screen time (except homework) less than 2 hours a day] : no screen time (except homework) less than 2 hours a day [Has interests/participates in community activities/volunteers] : does not have interests/participates in community activities/volunteers [Uses tobacco] : does not use tobacco [Uses drugs] : does not use drugs  [Drinks alcohol] : does not drink alcohol [Impaired/distracted driving] : no impaired/distracted driving [Has thought about hurting self or considered suicide] : has not thought about hurting self or considered suicide [de-identified] : Lives with mom in a 2-family house. Patient states she and mom live downstairs and the female family friend lives upstairs.  [de-identified] : [facility] [employer]  [FreeTextEntry1] : 15 yo Last SA 7/18/25 (vaginal and gave oral) without condom with male [FreeTextEntry2] : 3 Lifetime partners [FreeTextEntry3] : Male [FreeTextEntry4] : Attracted to males [FreeTextEntry5] : Condoms never [FreeTextEntry6] : Denies [FreeTextEntry7] : Denies [de-identified] : States she has trouble going to sleep while on her phone. Reports mood swings.

## 2025-07-24 NOTE — DISCUSSION/SUMMARY
[FreeTextEntry1] : Patient is a 15 yo F who presents to the [facility] for patch refill, last dispensed on 4/11/25.  Last vaginal and oral (gave) SA without condom was on 7/18/25 with male.   Coitarche 15 yo  3 Lifetime partners, current partner is a 17 yo M for 1 month Denies any anal SA ever.  Condoms never, states it is a shared mutual decision.  Denies trading sex for money, food, or shelter Denies human trafficking.  Patient reports that she has vaginal itching and a white thick discharge that started the day after having sex without condom. Denies abdominal pain, dysuria, back low, n/v or other /GI symptoms.   Patient is interested in continuing the transdermal patch.  She has no contraindications to estrogen as reviewed by the Medical Eligibility Criteria for Contraceptive Use as reviewed with patient today.     Urine HCG negative today.   Plan:  Emergency Contraception (EC) -Urine HCG negative today -Educated patient on EC and she verbalized understanding and accepted. -Reviewed consent, signed and scanned into chart. EC taken under direct observation, tolerated.  -Patient to RTC on 8/12/25 at 10:25am for a repeat pregnancy test.   Patch Refill -Reviewed signed consent present in chart. -Dispensed a 3-month supply of Zafemy patches.  -Counseled regarding rare risk of VTE, ACHES symptoms reviewed.  Counseled regarding common potential side effects and reviewed protocol for patches applied late and for detached patches.  -Encouraged consistent condom use for STI prevention, condoms offered and accepted. -Discussed emergency contraception and indications for use  -Follow up in 3-4 weeks for contraceptive surveillance and repeat pregnancy test, appointment scheduled for 8/12/25 at 10:25am or RTC sooner as needed.  -Patch refill appt scheduled for 10/6/25 at 9am  STI testing/Vaginal Discharge/Itching -BV panel completed -GC/CT (oral/urine), HIV, Syphilis testing completed -RTC on 7/24/25 at 11:05am for results and follow up -Educated patient on how HIV is transmitted as well as other STIs, educational handouts provided from [employer].  Behavioral health history reviewed and anticipatory guidance provided Patient agrees to re engaged in MH services with the [facility], appointment made with [facility] CHRISTOS Leger on 7/25/25 at 12:00pm.   Encouraged to verbalize any questions or concerns, all questions answered at this time.

## 2025-07-24 NOTE — PHYSICAL EXAM
[Symmetric Chest Wall] : symmetric chest wall [NL] : soft, nontender, nondistended, normal bowel sounds, no hepatosplenomegaly [Juan Ramon: ____] : Juan Ramon [unfilled] [Normal external genitalia] : normal external genitalia [Vaginal discharge] : vaginal discharge [Palpated at following regions:] : palpated at following regions: [Inguinal] : inguinal [Tenderness With Palpation of Chest Wall] : no tenderness with palpation of chest wall [Labial adhesions] : no labial adhesions [Erythematous labia minora] : nonerythematous labia minora [Erythematous labia majora] : nonerythematous labia majora [Excoriations in perineum] : no excoriations in perineum [FreeTextEntry6] : Thick clumpy white discharge, no odor noted.

## 2025-07-24 NOTE — RISK ASSESSMENT
[Eats meals with family] : eats meals with family [Has family members/adults to turn to for help] : has family members/adults to turn to for help [Is permitted and is able to make independent decisions] : Is permitted and is able to make independent decisions [Grade: ____] : Grade: [unfilled] [Eats regular meals including adequate fruits and vegetables] : eats regular meals including adequate fruits and vegetables [Drinks non-sweetened liquids] : drinks non-sweetened liquids  [Calcium source] : calcium source [Home is free of violence] : home is free of violence [Has friends] : has friends [At least 1 hour of physical activity a day] : at least 1 hour of physical activity a day [Uses safety belts/safety equipment] : uses safety belts/safety equipment  [Has peer relationships free of violence] : has peer relationships free of violence [Has/had oral sex] : has/had oral sex [Has had sexual intercourse] : has had sexual intercourse [Vaginal] : vaginal [Has ways to cope with stress] : has ways to cope with stress [Displays self-confidence] : displays self-confidence [Has problems with sleep] : has problems with sleep [Gets depressed, anxious, or irritable/has mood swings] : gets depressed, anxious, or irritable/has mood swings [With Teen] : teen [Has concerns about body or appearance] : does not have concerns about body or appearance [Screen time (except homework) less than 2 hours a day] : no screen time (except homework) less than 2 hours a day [Has interests/participates in community activities/volunteers] : does not have interests/participates in community activities/volunteers [Uses tobacco] : does not use tobacco [Uses drugs] : does not use drugs  [Drinks alcohol] : does not drink alcohol [Impaired/distracted driving] : no impaired/distracted driving [Has thought about hurting self or considered suicide] : has not thought about hurting self or considered suicide [de-identified] : Lives with mom in a 2-family house. Patient states she and mom live downstairs and the female family friend lives upstairs.  [de-identified] : [facility] [employer]  [FreeTextEntry1] : 15 yo Last SA 7/18/25 (vaginal and gave oral) without condom with male [FreeTextEntry2] : 3 Lifetime partners [FreeTextEntry3] : Male [FreeTextEntry4] : Attracted to males [FreeTextEntry5] : Condoms never [FreeTextEntry6] : Denies [FreeTextEntry7] : Denies [de-identified] : States she has trouble going to sleep while on her phone. Reports mood swings.

## 2025-07-24 NOTE — REVIEW OF SYSTEMS
[Dysuria] : no dysuria [Polyuria] : no polyuria [Hematuria] : no hematuria [Irregular Vaginal Bleeding] : no irregular vaginal bleeding [Vaginal Dischage] : vaginal discharge [Vaginal Itch] : vaginal itch [Irregular Menstrual Cycle] : no irregular menstrual cycle [Vaginal Pain] : no vaginal pain [Breast Swelling] : no breast swelling [Breast Tenderness] : no breast tenderness [Breast Discharge] : no breast discharge [Negative] : Heme/Lymph

## 2025-07-24 NOTE — RISK ASSESSMENT
[Eats meals with family] : eats meals with family [Has family members/adults to turn to for help] : has family members/adults to turn to for help [Is permitted and is able to make independent decisions] : Is permitted and is able to make independent decisions [Grade: ____] : Grade: [unfilled] [Eats regular meals including adequate fruits and vegetables] : eats regular meals including adequate fruits and vegetables [Drinks non-sweetened liquids] : drinks non-sweetened liquids  [Calcium source] : calcium source [Home is free of violence] : home is free of violence [Has friends] : has friends [At least 1 hour of physical activity a day] : at least 1 hour of physical activity a day [Uses safety belts/safety equipment] : uses safety belts/safety equipment  [Has peer relationships free of violence] : has peer relationships free of violence [Has/had oral sex] : has/had oral sex [Has had sexual intercourse] : has had sexual intercourse [Vaginal] : vaginal [Has ways to cope with stress] : has ways to cope with stress [Displays self-confidence] : displays self-confidence [Has problems with sleep] : has problems with sleep [Gets depressed, anxious, or irritable/has mood swings] : gets depressed, anxious, or irritable/has mood swings [With Teen] : teen [Has concerns about body or appearance] : does not have concerns about body or appearance [Screen time (except homework) less than 2 hours a day] : no screen time (except homework) less than 2 hours a day [Has interests/participates in community activities/volunteers] : does not have interests/participates in community activities/volunteers [Uses tobacco] : does not use tobacco [Uses drugs] : does not use drugs  [Drinks alcohol] : does not drink alcohol [Impaired/distracted driving] : no impaired/distracted driving [Has thought about hurting self or considered suicide] : has not thought about hurting self or considered suicide [de-identified] : Lives with mom in a 2-family house. Patient states she and mom live downstairs and the female family friend lives upstairs.  [de-identified] : [facility] [employer]  [FreeTextEntry1] : 15 yo Last SA 7/18/25 (vaginal and gave oral) without condom with male [FreeTextEntry2] : 3 Lifetime partners [FreeTextEntry3] : Male [FreeTextEntry4] : Attracted to males [FreeTextEntry5] : Condoms never [FreeTextEntry6] : Denies [FreeTextEntry7] : Denies [de-identified] : States she has trouble going to sleep while on her phone. Reports mood swings.

## 2025-07-24 NOTE — HISTORY OF PRESENT ILLNESS
[FreeTextEntry6] : Patient is a 15 yo F who presents to the [facility] for patch refill, last dispensed on 4/11/25.  Last vaginal and oral (gave) SA without condom was on 7/18/25 with male.   Coitarche 15 yo  3 Lifetime partners, current partner is a 17 yo M for 1 month. SA with previous partner per patient was "months ago" Denies any anal SA ever.  Condoms never, states it is a shared mutual decision.  Denies trading sex for money, food, or shelter Denies human trafficking.  Patient reports that she has vaginal itching and a white thick discharge that started the day after having sex without condom. Denies abdominal pain, dysuria, back low, n/v or other /GI symptoms.